# Patient Record
Sex: MALE | NOT HISPANIC OR LATINO | URBAN - METROPOLITAN AREA
[De-identification: names, ages, dates, MRNs, and addresses within clinical notes are randomized per-mention and may not be internally consistent; named-entity substitution may affect disease eponyms.]

---

## 2022-03-16 ENCOUNTER — OFFICE VISIT (OUTPATIENT)
Dept: URBAN - METROPOLITAN AREA CLINIC 12 | Facility: CLINIC | Age: 27
End: 2022-03-16
Payer: COMMERCIAL

## 2022-03-16 ENCOUNTER — DASHBOARD ENCOUNTERS (OUTPATIENT)
Age: 27
End: 2022-03-16

## 2022-03-16 VITALS
BODY MASS INDEX: 24.77 KG/M2 | HEIGHT: 74 IN | SYSTOLIC BLOOD PRESSURE: 123 MMHG | WEIGHT: 193 LBS | TEMPERATURE: 97.8 F | HEART RATE: 72 BPM | DIASTOLIC BLOOD PRESSURE: 81 MMHG

## 2022-03-16 DIAGNOSIS — R12 HEARTBURN: ICD-10-CM

## 2022-03-16 DIAGNOSIS — R10.13 DYSPEPSIA: ICD-10-CM

## 2022-03-16 DIAGNOSIS — F41.9 ANXIETY: ICD-10-CM

## 2022-03-16 DIAGNOSIS — R11.0 NAUSEA: ICD-10-CM

## 2022-03-16 PROBLEM — 48694002: Status: ACTIVE | Noted: 2022-03-16

## 2022-03-16 PROCEDURE — 99204 OFFICE O/P NEW MOD 45 MIN: CPT | Performed by: STUDENT IN AN ORGANIZED HEALTH CARE EDUCATION/TRAINING PROGRAM

## 2022-03-16 RX ORDER — ONDANSETRON 4 MG/1
1 TABLET ON THE TONGUE AND ALLOW TO DISSOLVE TABLET, ORALLY DISINTEGRATING ORAL
Qty: 30 TABLET | Refills: 1 | OUTPATIENT
Start: 2022-03-16

## 2022-03-16 RX ORDER — PANTOPRAZOLE SODIUM 40 MG/1
1 TABLET TABLET, DELAYED RELEASE ORAL ONCE A DAY
Qty: 30 | Refills: 11 | OUTPATIENT
Start: 2022-03-16

## 2022-03-16 NOTE — HPI-TODAY'S VISIT:
26 yo [place] male here for evaluation of nausesa/vomiting. He moved to the [place] 2 years ago. Complains of nausea/vomiting, reflux. Sx chronically, intermittent worsened with stress and anxiety. He says that reflux Sx worsen with certain foods.  Says that he feels hungry and full at the same time. He recently started work 2 weeks ago, and has been under more stress, and noticed his Sx are much worse.  No outside records available for review.  He takes advil rarely.  His wife is a nurse and has given him Zofran for nausea.  No prior EGD. No FH of GI malignancy.

## 2022-04-18 ENCOUNTER — OFFICE VISIT (OUTPATIENT)
Dept: URBAN - METROPOLITAN AREA CLINIC 12 | Facility: CLINIC | Age: 27
End: 2022-04-18

## 2022-04-18 RX ORDER — ONDANSETRON 4 MG/1
1 TABLET ON THE TONGUE AND ALLOW TO DISSOLVE TABLET, ORALLY DISINTEGRATING ORAL
Qty: 30 TABLET | Refills: 1 | Status: ACTIVE | COMMUNITY
Start: 2022-03-16

## 2022-04-18 RX ORDER — PANTOPRAZOLE SODIUM 40 MG/1
1 TABLET TABLET, DELAYED RELEASE ORAL ONCE A DAY
Qty: 30 | Refills: 11 | Status: ACTIVE | COMMUNITY
Start: 2022-03-16